# Patient Record
Sex: MALE | Race: BLACK OR AFRICAN AMERICAN | Employment: FULL TIME | URBAN - METROPOLITAN AREA
[De-identification: names, ages, dates, MRNs, and addresses within clinical notes are randomized per-mention and may not be internally consistent; named-entity substitution may affect disease eponyms.]

---

## 2024-06-28 ENCOUNTER — APPOINTMENT (OUTPATIENT)
Dept: GENERAL RADIOLOGY | Age: 44
End: 2024-06-28
Payer: MEDICAID

## 2024-06-28 ENCOUNTER — HOSPITAL ENCOUNTER (EMERGENCY)
Age: 44
Discharge: HOME | End: 2024-06-29
Attending: EMERGENCY MEDICINE
Payer: MEDICAID

## 2024-06-28 VITALS
OXYGEN SATURATION: 98 % | WEIGHT: 210 LBS | HEART RATE: 117 BPM | TEMPERATURE: 98.5 F | SYSTOLIC BLOOD PRESSURE: 137 MMHG | BODY MASS INDEX: 25.57 KG/M2 | HEIGHT: 76 IN | DIASTOLIC BLOOD PRESSURE: 91 MMHG | RESPIRATION RATE: 20 BRPM

## 2024-06-28 DIAGNOSIS — R07.9 CHEST PAIN, UNSPECIFIED TYPE: Primary | ICD-10-CM

## 2024-06-28 LAB
ANION GAP SERPL CALC-SCNC: 13 MMOL/L (ref 9–18)
BASOPHILS # BLD: 0 K/UL (ref 0–0.2)
BASOPHILS NFR BLD: 0 % (ref 0–2)
BUN SERPL-MCNC: 20 MG/DL (ref 6–23)
CALCIUM SERPL-MCNC: 9.6 MG/DL (ref 8.8–10.2)
CHLORIDE SERPL-SCNC: 104 MMOL/L (ref 98–107)
CO2 SERPL-SCNC: 23 MMOL/L (ref 20–28)
CREAT SERPL-MCNC: 1.02 MG/DL (ref 0.8–1.3)
D DIMER PPP FEU-MCNC: 0.3 UG/ML(FEU)
DIFFERENTIAL METHOD BLD: ABNORMAL
EOSINOPHIL # BLD: 0 K/UL (ref 0–0.8)
EOSINOPHIL NFR BLD: 0 % (ref 0.5–7.8)
ERYTHROCYTE [DISTWIDTH] IN BLOOD BY AUTOMATED COUNT: 13.4 % (ref 11.9–14.6)
GLUCOSE SERPL-MCNC: 92 MG/DL (ref 70–99)
HCT VFR BLD AUTO: 40.1 % (ref 41.1–50.3)
HGB BLD-MCNC: 13.6 G/DL (ref 13.6–17.2)
IMM GRANULOCYTES # BLD AUTO: 0 K/UL (ref 0–0.5)
IMM GRANULOCYTES NFR BLD AUTO: 0 % (ref 0–5)
LIPASE SERPL-CCNC: 23 U/L (ref 13–60)
LYMPHOCYTES # BLD: 1 K/UL (ref 0.5–4.6)
LYMPHOCYTES NFR BLD: 13 % (ref 13–44)
MAGNESIUM SERPL-MCNC: 1.9 MG/DL (ref 1.8–2.4)
MCH RBC QN AUTO: 30.7 PG (ref 26.1–32.9)
MCHC RBC AUTO-ENTMCNC: 33.9 G/DL (ref 31.4–35)
MCV RBC AUTO: 90.5 FL (ref 82–102)
MONOCYTES # BLD: 0.8 K/UL (ref 0.1–1.3)
MONOCYTES NFR BLD: 10 % (ref 4–12)
NEUTS SEG # BLD: 6.2 K/UL (ref 1.7–8.2)
NEUTS SEG NFR BLD: 77 % (ref 43–78)
NRBC # BLD: 0 K/UL (ref 0–0.2)
PLATELET # BLD AUTO: 205 K/UL (ref 150–450)
PMV BLD AUTO: 9.8 FL (ref 9.4–12.3)
POTASSIUM SERPL-SCNC: 3.8 MMOL/L (ref 3.5–5.1)
RBC # BLD AUTO: 4.43 M/UL (ref 4.23–5.6)
SODIUM SERPL-SCNC: 140 MMOL/L (ref 136–145)
TROPONIN T SERPL HS-MCNC: 18 NG/L (ref 0–22)
TSH W FREE THYROID IF ABNORMAL: 0.63 UIU/ML (ref 0.27–4.2)
WBC # BLD AUTO: 8 K/UL (ref 4.3–11.1)

## 2024-06-28 PROCEDURE — 84443 ASSAY THYROID STIM HORMONE: CPT

## 2024-06-28 PROCEDURE — 84484 ASSAY OF TROPONIN QUANT: CPT

## 2024-06-28 PROCEDURE — 85025 COMPLETE CBC W/AUTO DIFF WBC: CPT

## 2024-06-28 PROCEDURE — 93005 ELECTROCARDIOGRAM TRACING: CPT | Performed by: EMERGENCY MEDICINE

## 2024-06-28 PROCEDURE — 71046 X-RAY EXAM CHEST 2 VIEWS: CPT

## 2024-06-28 PROCEDURE — 85379 FIBRIN DEGRADATION QUANT: CPT

## 2024-06-28 PROCEDURE — 83735 ASSAY OF MAGNESIUM: CPT

## 2024-06-28 PROCEDURE — 80048 BASIC METABOLIC PNL TOTAL CA: CPT

## 2024-06-28 PROCEDURE — 83690 ASSAY OF LIPASE: CPT

## 2024-06-28 PROCEDURE — 99285 EMERGENCY DEPT VISIT HI MDM: CPT

## 2024-06-28 ASSESSMENT — ENCOUNTER SYMPTOMS
VOMITING: 0
DIARRHEA: 0
SHORTNESS OF BREATH: 0
COUGH: 0
FACIAL SWELLING: 0
ABDOMINAL PAIN: 0

## 2024-06-28 ASSESSMENT — LIFESTYLE VARIABLES
HOW OFTEN DO YOU HAVE A DRINK CONTAINING ALCOHOL: NEVER
HOW MANY STANDARD DRINKS CONTAINING ALCOHOL DO YOU HAVE ON A TYPICAL DAY: PATIENT DOES NOT DRINK

## 2024-06-28 ASSESSMENT — PAIN - FUNCTIONAL ASSESSMENT: PAIN_FUNCTIONAL_ASSESSMENT: NONE - DENIES PAIN

## 2024-06-29 VITALS
WEIGHT: 210 LBS | HEIGHT: 76 IN | HEART RATE: 62 BPM | RESPIRATION RATE: 14 BRPM | DIASTOLIC BLOOD PRESSURE: 77 MMHG | TEMPERATURE: 98.8 F | BODY MASS INDEX: 25.57 KG/M2 | OXYGEN SATURATION: 100 % | SYSTOLIC BLOOD PRESSURE: 120 MMHG

## 2024-06-29 DIAGNOSIS — F32.A DEPRESSION, UNSPECIFIED DEPRESSION TYPE: Primary | ICD-10-CM

## 2024-06-29 DIAGNOSIS — F19.10 SUBSTANCE ABUSE (HCC): ICD-10-CM

## 2024-06-29 LAB
EKG ATRIAL RATE: 100 BPM
EKG DIAGNOSIS: NORMAL
EKG P AXIS: 83 DEGREES
EKG P-R INTERVAL: 138 MS
EKG Q-T INTERVAL: 348 MS
EKG QRS DURATION: 84 MS
EKG QTC CALCULATION (BAZETT): 448 MS
EKG R AXIS: 74 DEGREES
EKG T AXIS: 70 DEGREES
EKG VENTRICULAR RATE: 100 BPM
TROPONIN T SERPL HS-MCNC: 15 NG/L (ref 0–22)

## 2024-06-29 PROCEDURE — 84484 ASSAY OF TROPONIN QUANT: CPT

## 2024-06-29 PROCEDURE — 93010 ELECTROCARDIOGRAM REPORT: CPT | Performed by: INTERNAL MEDICINE

## 2024-06-29 PROCEDURE — 99282 EMERGENCY DEPT VISIT SF MDM: CPT

## 2024-06-29 ASSESSMENT — LIFESTYLE VARIABLES
HOW MANY STANDARD DRINKS CONTAINING ALCOHOL DO YOU HAVE ON A TYPICAL DAY: PATIENT DOES NOT DRINK
HOW OFTEN DO YOU HAVE A DRINK CONTAINING ALCOHOL: NEVER

## 2024-06-29 NOTE — ED PROVIDER NOTES
Emergency Department Provider Note       PCP: No, Pcp   Age: 43 y.o.   Sex: male     DISPOSITION Decision To Discharge 06/29/2024 06:08:15 AM       ICD-10-CM    1. Depression, unspecified depression type  F32.A       2. Substance abuse (HCC)  F19.10           Medical Decision Making     Alert and oriented.  Appears to be malingering.  Given mental health resources.     1 chronic illness with exacerbation.  Shared medical decision making was utilized in creating the patients health plan today.    I independently ordered and reviewed each unique test.  I reviewed external records: ED visit note from an outside group.  I reviewed external records: provider visit note from outside specialist.     3/9/24 [facility] [place]:    Clinical Impression:    The patient is a 49-year-old  male well known to this clinical intake counselor. The patient was seen on March 1st, 2024, by this clinical intake counselor with the same complaint. On March 1st, 2024, [facility] collateral confirmed the patient has not been compliant with his appointments or medications. The patient does report smoking marijuana and drinking wine coolers two days ago and does not report using any cocaine or methamphetamines even though he does have a history. The patient's main complaint today is he and his baby's mother have arguments a lot because she is overbearing. He reports he does have deterrence's of harming someone because he loves his baby's mother as well as his eight-month-old. The patient said he decided to come to the emergency room because they did get in an argument, and he did not want to harm anyone.    The patient is future oriented stating he is wanting to get back on his medications however the patient did not follow up last week but states he will follow up on Monday at [facility], [facility]. The patient was encouraged to go to the emergency center and take the first step so he can \"get

## 2024-06-29 NOTE — ED TRIAGE NOTES
Pt seen last night and discharged. Given ride to shelter this morning. As soon as ride arrived pt states he feels that he needs to be seen for a mental health evaluation due to his anxiety and depression.

## 2024-06-29 NOTE — DISCHARGE INSTRUCTIONS
JUSTYN Banks   [phone]   They have multiple resources to help you.  Please call.  [email or web address]     Other local resources that are available are:       [facility]    [phone]  [email or web address] for inpatient and outpatient substance abuse issues.    [facility] [phone]  Medication assisted treatment    [facility]  [phone]     Suicide Hotline   1-456-IIPXENW     Narcotics Anonymous   www.na.org  Alcoholics Anonymous  www.aa.org

## 2024-06-29 NOTE — ED PROVIDER NOTES
Emergency Department Provider Note       PCP: No, Pcp   Age: 43 y.o.   Sex: male     DISPOSITION Decision To Discharge 06/29/2024 01:36:49 AM       ICD-10-CM    1. Chest pain, unspecified type  R07.9           Medical Decision Making     Workup unremarkable.  Tachycardic upon arrival, likely due to substance abuse.  Low suspicion for acute coronary syndrome.  Advised primary care follow-up.     1 or more acute illnesses that pose a threat to life or bodily function.   Shared medical decision making was utilized in creating the patients health plan today.    I independently ordered and reviewed each unique test.  I reviewed external records: ED visit note from an outside group.     I interpreted the X-rays no acute cardiopulmonary process.  My Independent EKG Interpretation: sinus rhythm, no evidence of arrhythmia and left ventricular hypertrophy      ST Segments:Nonspecific ST segments - NO STEMI   Rate: 100            History     43-year-old male presents with 2 days of constant left-sided chest pain that he describes as sharp and aching.  He had occasional worsening pain with deep breathing.  Denies shortness of breath or cough.  No fever, nausea, vomiting, abdominal pain.  He admits to significant increased recent stress.  He was kicked out of the sober living facility because his job changed at the [employer] and they were no longer able to transport him from [place] to his job.  He states his 1-year-old child lives in [place] with her mother and he is stressed about being away from her.  Also reports a history of bipolar with recent relapse on alcohol and cocaine.          ROS     Review of Systems   Constitutional:  Negative for fever.   HENT:  Negative for facial swelling.    Eyes:  Negative for visual disturbance.   Respiratory:  Negative for cough and shortness of breath.    Cardiovascular:  Positive for chest pain.   Gastrointestinal:  Negative for abdominal pain, diarrhea and vomiting.

## 2024-06-29 NOTE — ED TRIAGE NOTES
Pt ambulated to triage    Pt states he is experiencing chest pains starting yesterday.  No nausea, shortness of breath, gerd, or arm pain.  No cardiac history.

## 2024-06-29 NOTE — ED NOTES
I have reviewed discharge instructions with the patient.  The patient verbalized understanding.    Patient left ED via Discharge Method: ambulatory to Home.    Opportunity for questions and clarification provided.       Patient given 0 scripts.         To continue your aftercare when you leave the hospital, you may receive an automated call from our care team to check in on how you are doing.  This is a free service and part of our promise to provide the best care and service to meet your aftercare needs.” If you have questions, or wish to unsubscribe from this service please call [phone].  Thank you for Choosing our [facility] Emergency Department.

## 2024-07-03 ENCOUNTER — HOSPITAL ENCOUNTER (EMERGENCY)
Age: 44
Discharge: HOME | End: 2024-07-03

## 2024-07-03 ENCOUNTER — HOSPITAL ENCOUNTER (EMERGENCY)
Age: 44
Discharge: HOME | End: 2024-07-03
Attending: EMERGENCY MEDICINE

## 2024-07-03 VITALS
HEIGHT: 76 IN | TEMPERATURE: 98.1 F | SYSTOLIC BLOOD PRESSURE: 124 MMHG | RESPIRATION RATE: 16 BRPM | BODY MASS INDEX: 24.36 KG/M2 | WEIGHT: 200 LBS | OXYGEN SATURATION: 99 % | DIASTOLIC BLOOD PRESSURE: 81 MMHG | HEART RATE: 74 BPM

## 2024-07-03 VITALS
WEIGHT: 200 LBS | BODY MASS INDEX: 24.36 KG/M2 | TEMPERATURE: 97.1 F | SYSTOLIC BLOOD PRESSURE: 134 MMHG | DIASTOLIC BLOOD PRESSURE: 91 MMHG | RESPIRATION RATE: 16 BRPM | HEIGHT: 76 IN | OXYGEN SATURATION: 98 % | HEART RATE: 98 BPM

## 2024-07-03 DIAGNOSIS — R79.89 HIGH SERUM MAGNESIUM: Primary | ICD-10-CM

## 2024-07-03 LAB
ALBUMIN SERPL-MCNC: 3.8 G/DL (ref 3.5–5)
ALBUMIN/GLOB SERPL: 1.3 (ref 1–1.9)
ALP SERPL-CCNC: 75 U/L (ref 40–129)
ALT SERPL-CCNC: 14 U/L (ref 12–65)
ANION GAP SERPL CALC-SCNC: 15 MMOL/L (ref 9–18)
AST SERPL-CCNC: 22 U/L (ref 15–37)
BASOPHILS # BLD: 0 K/UL (ref 0–0.2)
BASOPHILS NFR BLD: 0 % (ref 0–2)
BILIRUB SERPL-MCNC: 0.4 MG/DL (ref 0–1.2)
BUN SERPL-MCNC: 9 MG/DL (ref 6–23)
CALCIUM SERPL-MCNC: 9.2 MG/DL (ref 8.8–10.2)
CHLORIDE SERPL-SCNC: 103 MMOL/L (ref 98–107)
CO2 SERPL-SCNC: 26 MMOL/L (ref 20–28)
CREAT SERPL-MCNC: 0.98 MG/DL (ref 0.8–1.3)
DIFFERENTIAL METHOD BLD: ABNORMAL
EOSINOPHIL # BLD: 0 K/UL (ref 0–0.8)
EOSINOPHIL NFR BLD: 1 % (ref 0.5–7.8)
ERYTHROCYTE [DISTWIDTH] IN BLOOD BY AUTOMATED COUNT: 13.4 % (ref 11.9–14.6)
GLOBULIN SER CALC-MCNC: 2.9 G/DL (ref 2.3–3.5)
GLUCOSE SERPL-MCNC: 91 MG/DL (ref 70–99)
HCT VFR BLD AUTO: 42.4 % (ref 41.1–50.3)
HGB BLD-MCNC: 14.1 G/DL (ref 13.6–17.2)
IMM GRANULOCYTES # BLD AUTO: 0 K/UL (ref 0–0.5)
IMM GRANULOCYTES NFR BLD AUTO: 0 % (ref 0–5)
LYMPHOCYTES # BLD: 1.1 K/UL (ref 0.5–4.6)
LYMPHOCYTES NFR BLD: 28 % (ref 13–44)
MAGNESIUM SERPL-MCNC: 2.5 MG/DL (ref 1.8–2.4)
MCH RBC QN AUTO: 30.7 PG (ref 26.1–32.9)
MCHC RBC AUTO-ENTMCNC: 33.3 G/DL (ref 31.4–35)
MCV RBC AUTO: 92.4 FL (ref 82–102)
MONOCYTES # BLD: 0.4 K/UL (ref 0.1–1.3)
MONOCYTES NFR BLD: 11 % (ref 4–12)
NEUTS SEG # BLD: 2.3 K/UL (ref 1.7–8.2)
NEUTS SEG NFR BLD: 60 % (ref 43–78)
NRBC # BLD: 0 K/UL (ref 0–0.2)
PLATELET # BLD AUTO: 235 K/UL (ref 150–450)
PMV BLD AUTO: 9.5 FL (ref 9.4–12.3)
POTASSIUM SERPL-SCNC: 4 MMOL/L (ref 3.5–5.1)
PROT SERPL-MCNC: 6.7 G/DL (ref 6.3–8.2)
RBC # BLD AUTO: 4.59 M/UL (ref 4.23–5.6)
SODIUM SERPL-SCNC: 144 MMOL/L (ref 136–145)
TROPONIN T SERPL HS-MCNC: 14 NG/L (ref 0–22)
WBC # BLD AUTO: 3.9 K/UL (ref 4.3–11.1)

## 2024-07-03 PROCEDURE — 93005 ELECTROCARDIOGRAM TRACING: CPT | Performed by: STUDENT IN AN ORGANIZED HEALTH CARE EDUCATION/TRAINING PROGRAM

## 2024-07-03 PROCEDURE — 85025 COMPLETE CBC W/AUTO DIFF WBC: CPT

## 2024-07-03 PROCEDURE — 96360 HYDRATION IV INFUSION INIT: CPT

## 2024-07-03 PROCEDURE — 99284 EMERGENCY DEPT VISIT MOD MDM: CPT

## 2024-07-03 PROCEDURE — 83735 ASSAY OF MAGNESIUM: CPT

## 2024-07-03 PROCEDURE — 84484 ASSAY OF TROPONIN QUANT: CPT

## 2024-07-03 PROCEDURE — 96361 HYDRATE IV INFUSION ADD-ON: CPT

## 2024-07-03 PROCEDURE — 2580000003 HC RX 258: Performed by: STUDENT IN AN ORGANIZED HEALTH CARE EDUCATION/TRAINING PROGRAM

## 2024-07-03 PROCEDURE — 80053 COMPREHEN METABOLIC PANEL: CPT

## 2024-07-03 PROCEDURE — 2580000003 HC RX 258: Performed by: NURSE PRACTITIONER

## 2024-07-03 RX ORDER — SODIUM CHLORIDE, SODIUM LACTATE, POTASSIUM CHLORIDE, AND CALCIUM CHLORIDE .6; .31; .03; .02 G/100ML; G/100ML; G/100ML; G/100ML
1000 INJECTION, SOLUTION INTRAVENOUS
Status: DISCONTINUED | OUTPATIENT
Start: 2024-07-03 | End: 2024-07-03

## 2024-07-03 RX ORDER — SODIUM CHLORIDE, SODIUM LACTATE, POTASSIUM CHLORIDE, AND CALCIUM CHLORIDE .6; .31; .03; .02 G/100ML; G/100ML; G/100ML; G/100ML
1000 INJECTION, SOLUTION INTRAVENOUS
Status: COMPLETED | OUTPATIENT
Start: 2024-07-03 | End: 2024-07-03

## 2024-07-03 RX ORDER — 0.9 % SODIUM CHLORIDE 0.9 %
1000 INTRAVENOUS SOLUTION INTRAVENOUS ONCE
Status: COMPLETED | OUTPATIENT
Start: 2024-07-03 | End: 2024-07-03

## 2024-07-03 RX ADMIN — SODIUM CHLORIDE, POTASSIUM CHLORIDE, SODIUM LACTATE AND CALCIUM CHLORIDE 1000 ML: 600; 310; 30; 20 INJECTION, SOLUTION INTRAVENOUS at 20:37

## 2024-07-03 RX ADMIN — SODIUM CHLORIDE 1000 ML: 900 INJECTION, SOLUTION INTRAVENOUS at 21:15

## 2024-07-03 ASSESSMENT — PAIN - FUNCTIONAL ASSESSMENT
PAIN_FUNCTIONAL_ASSESSMENT: NONE - DENIES PAIN
PAIN_FUNCTIONAL_ASSESSMENT: 0-10

## 2024-07-03 ASSESSMENT — PAIN SCALES - GENERAL: PAINLEVEL_OUTOF10: 0

## 2024-07-03 NOTE — ED NOTES
This RN spoke with patient who was siting outside the ER entrance. The patient stated that he bought an Uber and longer wants to be evaluated by our ER doctor. Charge nurse and Provider notified at this time.

## 2024-07-03 NOTE — ED PROVIDER NOTES
I signed up for the patient within 15 minutes of his arrival to the emergency department.  He was triaged and brought back to a room.  As I was discharging another patient the nurse came up to me and let me know that the patient no longer wants to be seen in the ER and has walked out of the emergency department.     Brady Mcintosh, DO  07/03/24 [id number]

## 2024-07-03 NOTE — ED TRIAGE NOTES
Pt [place] last 2 days. Pt reports feeling dizzy and \"my urine is darker\" concerned is dehydrated. Pt denies N/V F/C. Pt seen here earlier today but had to leave before being seen by provider.
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
## 2024-07-04 LAB
EKG ATRIAL RATE: 89 BPM
EKG DIAGNOSIS: NORMAL
EKG P AXIS: 76 DEGREES
EKG P-R INTERVAL: 118 MS
EKG Q-T INTERVAL: 404 MS
EKG QRS DURATION: 90 MS
EKG QTC CALCULATION (BAZETT): 491 MS
EKG R AXIS: 77 DEGREES
EKG T AXIS: 70 DEGREES
EKG VENTRICULAR RATE: 89 BPM

## 2024-07-04 PROCEDURE — 93010 ELECTROCARDIOGRAM REPORT: CPT | Performed by: INTERNAL MEDICINE

## 2024-07-04 NOTE — ED PROVIDER NOTES
Emergency Department Provider Note       PCP: No, Pcp   Age: 43 y.o.   Sex: male     DISPOSITION     dc    ICD-10-CM    1. High serum magnesium  R79.89           Medical Decision Making     As in HPI.  Patient is pleasant well-appearing.  Alert and x 4.  He is denying any current dizziness, lightheadedness, nausea or vomiting, headache, neck pain or stiffness or other complaint, aside from concern for dehydration, dark-colored urine and fatigue/generalized weakness.  Obtain basic labs largely reassuring, magnesium slightly high.  EKG was obtained, cardiologist over read notes no significant change from prior EKG. he is receiving IV fluids, he is talking on the phone and states he has now found a place to stay tonight.  He is denying any new or worsening symptoms and I have offered workup and further management, but he is declining these; stating he would like something to eat complaint anxiety fluids and be discharged.  Patient is free of distracting pain, alert x 4, shows some decision-making ability and an episode of shared decision making we will discharge home upon completion of IV hydration and meal.  I have low clinical suspicion at this time for stroke, dissection, TIA, ACS, rhabdomyolysis, sepsis, significant electrolyte abnormality or other acute emergent process.  On reevaluation, patient states he is feeling quite well.  He is largely wanting food at this time.  He was given a sandwich tray and ate this without problems.  He is resting comfortably at this time but he does not wish to have any further workup.  He has been told numerous times that we would like to obtain a urine sample to obtain UA, considering his culture dehydration and dark-colored urine but he is declining to provide urinalysis.  Continue to attempt to get a sample but ultimately cannot force patient to provide this for force further management, plan for discharge home.  Patient is well-hydrated appearing, no distress.